# Patient Record
(demographics unavailable — no encounter records)

---

## 2025-01-09 NOTE — DISCUSSION/SUMMARY
[FreeTextEntry1] : 35 y/o G0 LMP 12/29 with infertility and likely endometrioma based on MRI results, although with no significant pain, presenting for surgical consultation for endometriosis and endometrioma. - Discussed the role of endometriosis in infertility as above and discussed nature of laparoscopic surgery to remove endometriosis as above. - Reviewed that given she is not having significant pain, and IVF is an option for her, due to risk of surgery and delay in fertility care it would cause, as well as small negative impact on AMH ovarian cystectomy has, it may be more beneficial to forgo surgery and move forward with IVF instead as long as it is not felt that endometrioma is in the way of egg retrievals by [facility] team. Pt expressed understanding. - Thus advised that I will reach out to her [facility] team to discuss my recommendations. If it is felt that surgery is needed from [facility] perspective, we will move forward with scheduling robotic assisted laparoscopic left ovarian cystectomy, excision of endometriosis, chromopertubation, possible left salpingectomy, possible cystoscopy with ureteral catheter placement. - If surgery is not felt to be needed at this time by [facility] for IVF purposes however, I advised she make a follow up appointment with Dr. Marie to further discuss IVF in context of her disease and see if she would prefer to go the IVF route instead prior to committing to surgery. Pt expressed understanding. - Further management thus pending discussion with [facility], will inform patient of updates after discussion occurs.  Sarwat López MD

## 2025-01-09 NOTE — PHYSICAL EXAM
[Chaperone Present] : A chaperone was present in the examining room during all aspects of the physical examination [15136] : A chaperone was present during the pelvic exam. [Appropriately responsive] : appropriately responsive [Alert] : alert [No Acute Distress] : no acute distress [Oriented x3] : oriented x3 [Labia Majora] : normal [Labia Minora] : normal [Normal] : normal [FreeTextEntry6] : Exam limited by habitus but no obvious masses or nodularity and uterus seems to be normal sized

## 2025-01-09 NOTE — REASON FOR VISIT
[Initial] : an initial consultation for [FreeTextEntry2] : Consult for Laproscopic Procedure to remove endometriosis growth. Referred by specialist

## 2025-01-09 NOTE — PHYSICAL EXAM
[Chaperone Present] : A chaperone was present in the examining room during all aspects of the physical examination [10973] : A chaperone was present during the pelvic exam. [Appropriately responsive] : appropriately responsive [Alert] : alert [No Acute Distress] : no acute distress [Oriented x3] : oriented x3 [Labia Majora] : normal [Labia Minora] : normal [Normal] : normal [FreeTextEntry6] : Exam limited by habitus but no obvious masses or nodularity and uterus seems to be normal sized

## 2025-01-09 NOTE — HISTORY OF PRESENT ILLNESS
[Y] : Patient is sexually active [N] : Patient denies prior pregnancies [No] : Patient does not have concerns regarding sex [Currently Active] : currently active [Men] : men [Menarche Age: ____] : age at menarche was [unfilled] [PapSmeardate] : 03/06/24 [TextBox_31] : NEG [HPVDate] : 03/06/24 [TextBox_78] : NEG [LMPDate] : 12/29/24 [FreeTextEntry1] : 12/29/24

## 2025-01-09 NOTE — COUNSELING
[TextEntry] : Patient counseled on the pathophysiology of endometriosis and the nature of ectopic endometrial implants and potential cause of inflammation, pain, and scar tissue formation. Additionally counseled on the wide range of symptom presentation from completely asymptomatic to severe chronic pain and that degree of symptoms do not correlate with laparoscopic findings. Lastly counseled on association with infertility. Counseled that the only definitive way to diagnose endometriosis is via diagnostic laparoscopy with biopsies to be sent to pathology. Counseled that endometriosis is a chronic condition, that excision is not curative, and that new lesions will form overtime even after surgery. Counseled thus that medical management is typically recommended as first line treatment, but that even if an excisional surgery is performed due to failure of medical management, medical therapy will be strongly recommended post-operatively to suppress new endometriosis lesions from developing as much as possible.   Counseled that in context of fertility, surgery is only management option as all medical options act as contraception. Advised that surgery should help with pain if endometriosis is the source, but that impact on fertility is variable.   Discussed that one aspect of infertility is tubal occlusion, and that if this is found at time of surgery, repair can be attempted but success rate is less than 50% and risk of ectopic increases a small amount. Advised that if IVF is a treatment option that is feasible for her, that this is more successfully and the preferred first choice if bilateral tubal disease is present. Similarly counseled that if any hydrosalpinx is present this can also be detrimental to pregnancy and if unable to be repaired or resolved, salpingectomy to remove hydrosalpinx may be recommended. If contralateral tube is patent, unilateral salpingectomy should not impact fertility.   Separately discussed that presence of endometriosis alone even if tubes are patent can cause infertility. Advised that excision of all lesions can help increase chances at conceiving, especially in severe disease, but similarly advised that IVF without surgery can overcome infertility from endometriosis so if that is an option for patient, it is reasonable to consider attempting this first as surgery includes risks that may be unnecessary to undertake if being done for fertility alone, though if pain is also a concern surgery may still be warranted. Advised that if findings of lesions or large endometriomas on imaging are seen, surgery may be more beneficial from fertility standpoint than if no findings on imaging are present.  Discussed that if IVF is being pursued, presence of endometriomas may at times impact egg retrieval but not always and that I would defer decision to whether or not removal is necessary to the consulting fertility specialist. Discussed that AMH and thus possibly ovarian reserved can be decreased some after ovarian cystectomy for endometriomas, but that benefits of removal may outweigh the risks of this under certain circumstances and again would confer with [facility] regarding this as well. Discussed rare risk of oophorectomy at time of surgery. Lastly discussed that if hydrosalpinx is present this can decrease success of embryo implantation and salpingectomy would be recommended. Discussed that risk of leaving or trying to repair damaged tube is further scarring in the future that could lead to need for an additional surgery for salpingectomy at the time embryo transfer is being pursued.   Surgical options discussed included diagnostic laparoscopy with excision of any evidence of endometriosis, lesions, or scar tissue, chromopertubation to evaluate tubal patency, and possible salpingectomy if warranted. Risks, benefits, and side effects of all of these options reviewed, and patient understands that there is increased risk of bleeding, infection, damage to organs, and conversion to laparotomy with endometriosis surgery due to nature of the disease sometimes causing severe adhesions and inflammation.  Patient expressed understanding.